# Patient Record
(demographics unavailable — no encounter records)

---

## 2025-06-23 NOTE — ASSESSMENT
[FreeTextEntry1] : The patient is a 68-year-old female chief complaint of right knee pain.  She developed a medial sided ache in her right knee for several months.  It mostly bothers her with golfing.  She rested and it definitely felt better.  About 4 weeks ago the knee acted up again and became swollen.  She had it drained in [place] and got a cortisone shot.  This only took some of the edge off.  She now can barely ambulate with a needs a cane.  She has pain at night and at rest.  She has difficulty with squatting and pivoting.  She has a clicking sensation in her knee but no true locking.  Examination of the right lower extremity reveals a normal neurovascular exam.  Examination the right knee reveals a full range of motion with pain on hyperflexion.  She has medial joint line pain with a positive Willis's sign.  She has a trace effusion.  There is no instability or apprehension.  Her patella tracking is normal with no crepitation or apprehension.  X-rays not available today but by report are normal through 2 sources.  My independent review of her MRI of the right knee done at [facility] radiology on May 30, 2025 shows a large complex tear of the medial meniscus along its posterior horn with unstable flaps.  There appears to be minimal degenerative changes in the chondral surfaces.  My recommendation at this time is that we proceed with a right knee arthroscopy and partial medial meniscectomy.  She has failed conservative management.  All risks, benefits and alternatives of the procedure were discussed with the patient in detail and she wishes to proceed.

## 2025-06-23 NOTE — HISTORY OF PRESENT ILLNESS
[] : no [FreeTextEntry5] : 69 y/o F presents for a NP eval today. states to have a tear in the meniscus. had MRI 3 weeks ago in [facility]. has some swelling .

## 2025-06-23 NOTE — ASSESSMENT
[FreeTextEntry1] : The patient is a 68-year-old female chief complaint of right knee pain.  She developed a medial sided ache in her right knee for several months.  It mostly bothers her with golfing.  She rested and it definitely felt better.  About 4 weeks ago the knee acted up again and became swollen.  She had it drained in [place] and got a cortisone shot.  This only took some of the edge off.  She now can barely ambulate with a needs a cane.  She has pain at night and at rest.  She has difficulty with squatting and pivoting.  She has a clicking sensation in her knee but no true locking.  Examination of the right lower extremity reveals a normal neurovascular exam.  Examination the right knee reveals a full range of motion with pain on hyperflexion.  She has medial joint line pain with a positive Willis's sign.  She has a trace effusion.  There is no instability or apprehension.  Her patella tracking is normal with no crepitation or apprehension.  X-rays not available today but by report are normal through 2 sources.  My independent review of her MRI of the right knee done at [facility] radiology on May 30, 2025 shows a large complex tear of the medial meniscus along its posterior horn with unstable flaps.  There appears to be minimal degenerative changes in the chondral surfaces.  My recommendation at this time is that we proceed with a right knee arthroscopy and partial medial meniscectomy.  She has failed conservative management.  All risks, benefits and alternatives of the procedure were discussed with the patient in detail and she wishes to proceed.

## 2025-07-14 NOTE — ASSESSMENT
[FreeTextEntry1] : The patient is s/p 2 weeks from a right knee arthroscopy and partial medial meniscectomy on 7/1/25. The patient denies fever, chills, CP, SOB. The patient denies drainage or discharge from their incision. The patient is doing well postoperatively. The patient has not needed pain medications. They deny new trauma or paresthesias. The patient can complete ADLs. She continues to have burning pain to the medial joint line but feels her knee is less overall pain and more stable. She jovanny slowly increase leisure activity and begin PT.   Right knee exam: The patient is in no apparent distress. Neurovascularly intact. Sensation to right lower extremity. 2+ pulses to posterior tibialis. Operative incision is clean, dry, and intact. Sutures removed. Steris placed. No active drainage or discharge. No signs of infection or DVT. Tender to palpation to the medial joint line. Ranging  0-125. - Crepitus. - pain with varus/valgus stress. - anterior/posterior drawer.  The patient will begin PT. She will return in 4 weeks with Dr. Talbot. She is doing well overall.